# Patient Record
Sex: MALE | Race: BLACK OR AFRICAN AMERICAN | URBAN - METROPOLITAN AREA
[De-identification: names, ages, dates, MRNs, and addresses within clinical notes are randomized per-mention and may not be internally consistent; named-entity substitution may affect disease eponyms.]

---
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
## 2019-09-25 PROBLEM — Z23 NEED FOR HPV VACCINATION: Status: RESOLVED | Noted: 2017-05-04 | Resolved: 2019-09-25

## 2019-09-25 PROBLEM — Z00.129 ENCOUNTER FOR ROUTINE CHILD HEALTH EXAMINATION WITHOUT ABNORMAL FINDINGS: Status: RESOLVED | Noted: 2017-05-04 | Resolved: 2019-09-25

## 2019-09-25 PROBLEM — Z23 NEED FOR TDAP VACCINATION: Status: RESOLVED | Noted: 2017-05-04 | Resolved: 2019-09-25
